# Patient Record
(demographics unavailable — no encounter records)

---

## 2024-12-05 NOTE — PHYSICAL EXAM
[Normal Jugular Venous A Waves Present] : normal jugular venous A waves present [Normal Jugular Venous V Waves Present] : normal jugular venous V waves present [No Jugular Venous Gibbons A Waves] : no jugular venous gibbons A waves [Respiration, Rhythm And Depth] : normal respiratory rhythm and effort [Exaggerated Use Of Accessory Muscles For Inspiration] : no accessory muscle use [Auscultation Breath Sounds / Voice Sounds] : lungs were clear to auscultation bilaterally [Heart Rate And Rhythm] : heart rate and rhythm were normal [Heart Sounds] : normal S1 and S2 [Abdomen Soft] : soft [Abdomen Tenderness] : non-tender [Abdomen Mass (___ Cm)] : no abdominal mass palpated [Nail Clubbing] : no clubbing of the fingernails [Cyanosis, Localized] : no localized cyanosis [Petechial Hemorrhages (___cm)] : no petechial hemorrhages [] : no ischemic changes [FreeTextEntry1] : nonverbal [de-identified] : nonverbal, history from [facility] manager, wheel chair bound, contracted

## 2024-12-05 NOTE — PHYSICAL EXAM
[Normal Jugular Venous A Waves Present] : normal jugular venous A waves present [Normal Jugular Venous V Waves Present] : normal jugular venous V waves present [No Jugular Venous Gibbons A Waves] : no jugular venous gibbons A waves [Respiration, Rhythm And Depth] : normal respiratory rhythm and effort [Exaggerated Use Of Accessory Muscles For Inspiration] : no accessory muscle use [Auscultation Breath Sounds / Voice Sounds] : lungs were clear to auscultation bilaterally [Heart Rate And Rhythm] : heart rate and rhythm were normal [Heart Sounds] : normal S1 and S2 [Abdomen Soft] : soft [Abdomen Tenderness] : non-tender [Abdomen Mass (___ Cm)] : no abdominal mass palpated [Nail Clubbing] : no clubbing of the fingernails [Cyanosis, Localized] : no localized cyanosis [Petechial Hemorrhages (___cm)] : no petechial hemorrhages [] : no ischemic changes [FreeTextEntry1] : nonverbal [de-identified] : nonverbal, history from [facility] manager, wheel chair bound, contracted

## 2024-12-05 NOTE — REVIEW OF SYSTEMS
[Negative] : Heme/Lymph [FreeTextEntry2] : nonverbal, history from [facility] manager [FreeTextEntry9] : contracted

## 2024-12-05 NOTE — REASON FOR VISIT
[Other: ____] : [unfilled] [Follow-Up - Clinic] : a clinic follow-up of [FreeTextEntry1] : Cristian has a past medical history of severe HOCM conservative mgt, seizure disorder, advanced developmental delay, low functional capacity, wheelchair bound, nonverbal, hypertension, dyslipidemia, moderate MR, abnormal EKG, sinus rhythm, PRWP old anteroseptal MI unchanged, LVEF 70% echo Feb 2020.   Patient is seen in the office today with the [facility] manager who is able to give accurate history. No report of  chest pain, shortness of breath, or syncope. Patient is wheelchair bound, nonverbal, not able to give history. No new symptoms reported. Average home BPs at guideline goal on lopressor.   Echocardiogram Nov 2022 LVEF 65 to 70%, artifact seen on left ventricular septum, S.A.M. with increased LVOT gradient consistent with prior obstruction, moderate MR  Echocardiogram February 2020, LVEF 75%, moderate concentric LVH, mild diastolic dysfunction, moderate eccentric MR, severe LVOT obstruction with a peak gradient 85 mmHg, prior gradient 152.  Ascending aorta 4.4 cm, PASP 42 mmHg  EKG August 2019, sinus rhythm LVH with ST-T wave abnormality,  unchanged EKG  Echocardiogram February 2019 LV EF >65%, mild MR, moderate AS, severe LVOT obstruction peak LVOT gradient 219 mmHg, mean LVOT gradient 152 mmHg, normal RVSP  Echocardiogram February 2019 LVEF 65%, normal diastolic function, RADHA mitral valve with mild MR, aortic valve not well-visualized, moderate AS, LVOT gradient peak 219 mmHg, mean 152 mmHg consistent with severe LVOT obstruction, normal RVSP.  Findings discussed with healthcare proxy at [facility] Shadia Smartcecilia [phone].  Conservative management, continue medical therapy, no plan for catheterization or surgery.    Average home blood pressures 110/65 , heart rate 75 at [facility] on Lopressor 100mg AM and 50mg PM.   2-D echo 8/10/18 LVEF 65%, mild diastolic dysfunction, RADHA mitral valve leaflet, mild to moderate MR, mild TR, peak LVOT gradient 93 mmHg with a mean gradient 51 mmHg consistent with severe LVOT obstruction, mild pulmonary hypertension, PASP 44 mmHg.  Technically difficult study patient sitting in a wheelchair.   2-D echo August 2017, LVEF 60% mild diastolic dysfunction, mild to moderate MR, aortic valve not clearly seen, sigmoid septum 1.4cm, moderate TR, moderate pulmonary hypertension, PASP 56 mmHg, suboptimal study.  2-D echo 1/15/16, EF 65%, mild diastolic dysfunction, ascending aorta 3.6 cm, mild LVOT gradient, mild MR, suboptimal study patient not able to cooperate  Labs December 2017, total cholesterol 245, triglyceride 348, HDL 35, , patient started on fish oil, CBC, BMP and LFT normal range  Normal Sinus Rhythm  LVH, NSST,  old septal MI no change from 1/15/16,   2DEcho November 2013 reveals normal LVEF of 65% with normal wall motion, mild diastolic dysfunction, mild aortic root enlargement, thickening of the mitral valve, systolic anterior motion of the mitral valve leaflet, mild MR, and trace TR. Difficult Doppler study possible increased in LVOT velocity.  [FreeTextEntry2] : severe HOCM conserverative management on medical therapy lopressor,  low functional capacity, wheel chair bound, nonverbal, [facility]

## 2024-12-05 NOTE — REVIEW OF SYSTEMS
[Negative] : Heme/Lymph [FreeTextEntry2] : nonverbal, history from [facility] manager [FreeTextEntry9] : contracted

## 2024-12-05 NOTE — DISCUSSION/SUMMARY
[FreeTextEntry1] : Patient has medical history detailed above and active medical issues including:  - Severe HOCM improved LVOT gradient on lopressor, home BPs at goal. Findings discussed with healthcare proxy at [facility] Shadia Rey [phone] in the past. Conservative management, continue medical therapy, no plan for catheterization or surgery.  Average home blood pressures at guideline goal on Lopressor  - Abnormal baseline EKG with PRWP old anteroseptal MI unchanged. Normal wall motion echo Feb 2020.  - Hypertension.  Average home BPs at guideline goal on Lopressor.  - Dyslipidemia Hypertriglyceridemia on low carbohydrate diet and fish oil  - History of seizure, advanced developmental delay, low functional capacity, nonverbal, wheelchair bound.  Continued risk factor modification has been discussed with regard to low-sodium/low-fat diet.   Cristian will be seen in cardiology follow up in 6 months same day echo and office visit.   Current cardiac medications remain unchanged. Repeat labs will be ordered with PMD.   Cristian will follow up with Dr Gus Zuñiga for primary care.  Total time spent 45 minutes, reviewing of test results, chart information, patient discussion, physical exam and completion of chart documentation.

## 2024-12-05 NOTE — REASON FOR VISIT
[Other: ____] : [unfilled] [Follow-Up - Clinic] : a clinic follow-up of [FreeTextEntry1] : Cristian has a past medical history of severe HOCM conservative mgt, seizure disorder, advanced developmental delay, low functional capacity, wheelchair bound, nonverbal, hypertension, dyslipidemia, moderate MR, abnormal EKG, sinus rhythm, PRWP old anteroseptal MI unchanged, LVEF 70% echo Feb 2020.   Patient is seen in the office today with the [facility] manager who is able to give accurate history. No report of  chest pain, shortness of breath, or syncope. Patient is wheelchair bound, nonverbal, not able to give history. No new symptoms reported. Average home BPs at guideline goal on lopressor.   Echocardiogram Nov 2022 LVEF 65 to 70%, artifact seen on left ventricular septum, S.A.M. with increased LVOT gradient consistent with prior obstruction, moderate MR  Echocardiogram February 2020, LVEF 75%, moderate concentric LVH, mild diastolic dysfunction, moderate eccentric MR, severe LVOT obstruction with a peak gradient 85 mmHg, prior gradient 152.  Ascending aorta 4.4 cm, PASP 42 mmHg  EKG August 2019, sinus rhythm LVH with ST-T wave abnormality,  unchanged EKG  Echocardiogram February 2019 LV EF >65%, mild MR, moderate AS, severe LVOT obstruction peak LVOT gradient 219 mmHg, mean LVOT gradient 152 mmHg, normal RVSP  Echocardiogram February 2019 LVEF 65%, normal diastolic function, RADHA mitral valve with mild MR, aortic valve not well-visualized, moderate AS, LVOT gradient peak 219 mmHg, mean 152 mmHg consistent with severe LVOT obstruction, normal RVSP.  Findings discussed with healthcare proxy at [facility] Shadia Smartcecilia [phone].  Conservative management, continue medical therapy, no plan for catheterization or surgery.    Average home blood pressures 110/65 , heart rate 75 at [facility] on Lopressor 100mg AM and 50mg PM.   2-D echo 8/10/18 LVEF 65%, mild diastolic dysfunction, RADHA mitral valve leaflet, mild to moderate MR, mild TR, peak LVOT gradient 93 mmHg with a mean gradient 51 mmHg consistent with severe LVOT obstruction, mild pulmonary hypertension, PASP 44 mmHg.  Technically difficult study patient sitting in a wheelchair.   2-D echo August 2017, LVEF 60% mild diastolic dysfunction, mild to moderate MR, aortic valve not clearly seen, sigmoid septum 1.4cm, moderate TR, moderate pulmonary hypertension, PASP 56 mmHg, suboptimal study.  2-D echo 1/15/16, EF 65%, mild diastolic dysfunction, ascending aorta 3.6 cm, mild LVOT gradient, mild MR, suboptimal study patient not able to cooperate  Labs December 2017, total cholesterol 245, triglyceride 348, HDL 35, , patient started on fish oil, CBC, BMP and LFT normal range  Normal Sinus Rhythm  LVH, NSST,  old septal MI no change from 1/15/16,   2DEcho November 2013 reveals normal LVEF of 65% with normal wall motion, mild diastolic dysfunction, mild aortic root enlargement, thickening of the mitral valve, systolic anterior motion of the mitral valve leaflet, mild MR, and trace TR. Difficult Doppler study possible increased in LVOT velocity.  [FreeTextEntry2] : severe HOCM conserverative management on medical therapy lopressor,  low functional capacity, wheel chair bound, nonverbal, [facility]

## 2024-12-05 NOTE — DISCUSSION/SUMMARY
[FreeTextEntry1] : Patient has medical history detailed above and active medical issues including:  - Severe HOCM improved LVOT gradient on lopressor, home BPs at goal. Findings discussed with healthcare proxy at [facility] Shadia Rey [phone] in the past. Conservative management, continue medical therapy, no plan for catheterization or surgery.  Average home blood pressures at guideline goal on Lopressor  - Abnormal baseline EKG with PRWP old anteroseptal MI unchanged. Normal wall motion echo Feb 2020.  - Hypertension.  Average home BPs at guideline goal on Lopressor.  - Dyslipidemia Hypertriglyceridemia on low carbohydrate diet and fish oil  - History of seizure, advanced developmental delay, low functional capacity, nonverbal, wheelchair bound.  Continued risk factor modification has been discussed with regard to low-sodium/low-fat diet.   Cristian will be seen in cardiology follow up in 6 months same day echo and office visit.   Current cardiac medications remain unchanged. Repeat labs will be ordered with PMD.   Cristian will follow up with Dr Gus Zuñiga for primary care.  Total time spent 45 minutes, reviewing of test results, chart information, patient discussion, physical exam and completion of chart documentation.

## 2025-05-20 NOTE — HISTORY OF PRESENT ILLNESS
[FreeTextEntry1] : CIARRA VARGAS is a 65 year old male with a past medical history of severe HOCM conservative management on medical therapy lopressor, low functional capacity, wheel chair bound, nonverbal, history from [facility] aide with patient. severe HOCM conservative mgt, seizure disorder, advanced developmental delay, low functional capacity, wheelchair bound, nonverbal, hypertension, dyslipidemia, moderate MR, abnormal EKG, sinus rhythm, PRWP old anteroseptal MI unchanged, LVEF 70% echo Feb 2020.    Last seen 12/5/24. Patient is seen in the office today with the [facility] manager who is able to give accurate history. No report of chest pain, shortness of breath, or syncope. Patient is wheelchair bound, nonverbal, not able to give history. No new symptoms reported.  Testing:  EKG 5/19/25: SR at 90 bpm, NY interval 168 ms, QTc 405 ms, LVH, PRWP, nonspecific ST-T wave abnormalities   Echo 5/19/25: CONCLUSIONS: 1. Left ventricular cavity is small. Left ventricular systolic function is hyperdynamic with an ejection  fraction visually estimated at 65 to 70 %. 2. Basal septal hypertrophy (measures 1.4 cm) with severe left ventricular outflow tract obstruction with a  gradient of 85 mmHg. 3. Mild to moderate left ventricular hypertrophy. 4. The left ventricular diastolic function is indeterminate. 5. Left atrium is mildly dilated. 6. Aortic root at the sinuses of Valsalva is dilated, measuring 4.10 cm (indexed 2.70 cm/m). Ascending  aorta is dilated, measuring 3.90 cm (indexed 2.57 cm/m). 7. Severe LVOTO limits assessment of aortic strenosis. AOV in limited short axis images appears to  open normally. 8. Moderate mitral regurgitation. The mitral regurgitant jet is eccentrically directed. 9. Estimated pulmonary artery systolic pressure is 26 mmHg. 10. RADHA 11. Compared to the transthoracic echocardiogram performed on 11/23/2022, there have been no  significant interval changes.  Labs 3/7/25: HCT 41.3, WBC 7.33, Hgb 13.4, plt 133, Chol 120, HDL 40, Trigs 121, LDL 59, ALT 20, AST 37, Na 144, K 5.1, Cr 0.42,Mag 2.5, TSH 2.66  Labs 3/8/24: Chol 151, HDL 38, Trigs 128, LDL 89, Mag 2.3, TSH 4.4, Na 140, K 4.7, Cr 0.49, Ca 9.7, AST 22, ALT 19, A1C 5, QBX 6.83, Hgb 13.1, HCT 39.1, plt 147.  Echocardiogram Nov 2022 CONCLUSIONS: 1. Left ventricular ejection fraction is normal with an ejection fraction of 64 % by Andrade's  biplane method of discs, and estimated at 65 to 70%. 2. Small restrictive VSD vs cross talk artifact from LVOTO noted. Hemodynamically insignificant. 3. Systolic anterior motion of the mitral valve with an increased left ventricular outflow tract  gradient indicative of severe left ventricular outflow tract obstruction. 4. Mildly dilated left atrium. 5. Moderate mitral valve regurgitation. 6. No subcostal images to evaluate for pericardial effusion. 7. TTE study from 5/17/2022, normal LV function.  EKG 5/17/22: SR at 80 bpm, NY interval 162 ms, QTc 421 ms, PRWP, possible old anteroseptal infarct, nonspecific ST-T wave abnormalities   Echo 5/17/22: Prelim. Mod MR. Mod septal hypertrophy. Mild concentric LVH. Hyperdynamic LVSF. Endocardium NWV, regional wall motion cannot be commented on. Peak LVOT gradient equals 76mmHg, consistent with severe LVOT. TDS due to patient being scanned upright in wheelchair with frequent movement. Compared with echo 2/19/20, unable to assess for possible VSD.  Labs 3/9/22: Chol 231, HDL 37, Trigs 299, , Na 137, K 4.9, Cr 0.56, Ca 9.9, AST 23, ALT 34, WBC 6.84, Hgb 14.2, HCT 45.6, plt 155,   Echocardiogram done by echo [facility] October 2021, LVEF 65-70%, no mention of HOCM  EKG August 2019, sinus rhythm LVH with ST-T wave abnormality, unchanged EKG

## 2025-05-20 NOTE — END OF VISIT
[FreeTextEntry3] : 65y M w/ severe developmental delay with low functional capacity, nonverbal and wheelchair bound with HOCM and significant LVOT gradient, managed conservatively, HLD, mod MR, who presents for follow up.  Will plan for continued conservative management with beta blocker and continued surveillance TTE. Continue Crestor 20mg and Zetia 10mg

## 2025-05-20 NOTE — PHYSICAL EXAM
[Well Developed] : well developed [Well Nourished] : well nourished [No Acute Distress] : no acute distress [Normal Conjunctiva] : normal conjunctiva [Normal Venous Pressure] : normal venous pressure [No Carotid Bruit] : no carotid bruit [Normal S1, S2] : normal S1, S2 [No Murmur] : no murmur [No Rub] : no rub [No Gallop] : no gallop [Clear Lung Fields] : clear lung fields [Good Air Entry] : good air entry [No Respiratory Distress] : no respiratory distress  [Soft] : abdomen soft [Non Tender] : non-tender [No Masses/organomegaly] : no masses/organomegaly [Normal Bowel Sounds] : normal bowel sounds [Normal Gait] : normal gait [No Edema] : no edema [No Cyanosis] : no cyanosis [No Clubbing] : no clubbing [No Varicosities] : no varicosities [No Rash] : no rash [No Skin Lesions] : no skin lesions [Moves all extremities] : moves all extremities [No Focal Deficits] : no focal deficits [Normal Speech] : normal speech [Alert and Oriented] : alert and oriented [Normal memory] : normal memory [de-identified] : murmur that radiates to carotids

## 2025-05-20 NOTE — DISCUSSION/SUMMARY
[FreeTextEntry1] : CIARRA VARGAS is a 62 year old M who presents today May 17, 2022 with the above history and the following active issues:  - Severe HOCM improved LVOT gradient on lopressor, home BPs at goal. Findings discussed with healthcare proxy at [facility] [phone] in the past. Conservative management, continue medical therapy, no plan for catheterization or surgery. Average home blood pressures at guideline goal on Lopressor 100mg AM and 50mg PM, continue current meds.   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  - Hypertension. Average home BPs at guideline goal on Lopressor.  - Dyslipidemia Hypertriglyceridemia on low carbohydrate diet and fish oil, statin, and Zetia. On ASA.  - History of seizure, advanced developmental delay, low functional capacity, nonverbal, wheelchair bound.  Continued risk factor modification has been discussed with regard to low-sodium/low-fat diet.   Ongoing f/u with PCP.  F/U in 6 months with echo and carotid same day. Discussed red flag symptoms, which would warrant sooner or emergent medical evaluation. Any questions and concerns were addressed and resolved.  Sincerely, Yesenia King [facility] Patient's history, testing, and plan was reviewed with supervising physician, Dr. Willian Freeman

## 2025-05-20 NOTE — PHYSICAL EXAM
[Well Developed] : well developed [Well Nourished] : well nourished [No Acute Distress] : no acute distress [Normal Conjunctiva] : normal conjunctiva [Normal Venous Pressure] : normal venous pressure [No Carotid Bruit] : no carotid bruit [Normal S1, S2] : normal S1, S2 [No Murmur] : no murmur [No Rub] : no rub [No Gallop] : no gallop [Clear Lung Fields] : clear lung fields [Good Air Entry] : good air entry [No Respiratory Distress] : no respiratory distress  [Soft] : abdomen soft [Non Tender] : non-tender [No Masses/organomegaly] : no masses/organomegaly [Normal Bowel Sounds] : normal bowel sounds [Normal Gait] : normal gait [No Edema] : no edema [No Cyanosis] : no cyanosis [No Clubbing] : no clubbing [No Varicosities] : no varicosities [No Rash] : no rash [No Skin Lesions] : no skin lesions [Moves all extremities] : moves all extremities [No Focal Deficits] : no focal deficits [Normal Speech] : normal speech [Alert and Oriented] : alert and oriented [Normal memory] : normal memory [de-identified] : murmur that radiates to carotids

## 2025-05-20 NOTE — PHYSICAL EXAM
[Well Developed] : well developed [Well Nourished] : well nourished [No Acute Distress] : no acute distress [Normal Conjunctiva] : normal conjunctiva [Normal Venous Pressure] : normal venous pressure [No Carotid Bruit] : no carotid bruit [Normal S1, S2] : normal S1, S2 [No Murmur] : no murmur [No Rub] : no rub [No Gallop] : no gallop [Clear Lung Fields] : clear lung fields [Good Air Entry] : good air entry [No Respiratory Distress] : no respiratory distress  [Soft] : abdomen soft [Non Tender] : non-tender [No Masses/organomegaly] : no masses/organomegaly [Normal Bowel Sounds] : normal bowel sounds [Normal Gait] : normal gait [No Edema] : no edema [No Cyanosis] : no cyanosis [No Clubbing] : no clubbing [No Varicosities] : no varicosities [No Rash] : no rash [No Skin Lesions] : no skin lesions [Moves all extremities] : moves all extremities [No Focal Deficits] : no focal deficits [Normal Speech] : normal speech [Alert and Oriented] : alert and oriented [Normal memory] : normal memory [de-identified] : murmur that radiates to carotids

## 2025-05-20 NOTE — HISTORY OF PRESENT ILLNESS
[FreeTextEntry1] : CIARRA VARGAS is a 65 year old male with a past medical history of severe HOCM conservative management on medical therapy lopressor, low functional capacity, wheel chair bound, nonverbal, history from [facility] aide with patient. severe HOCM conservative mgt, seizure disorder, advanced developmental delay, low functional capacity, wheelchair bound, nonverbal, hypertension, dyslipidemia, moderate MR, abnormal EKG, sinus rhythm, PRWP old anteroseptal MI unchanged, LVEF 70% echo Feb 2020.    Last seen 12/5/24. Patient is seen in the office today with the [facility] manager who is able to give accurate history. No report of chest pain, shortness of breath, or syncope. Patient is wheelchair bound, nonverbal, not able to give history. No new symptoms reported.  Testing:  EKG 5/19/25: SR at 90 bpm, W[place] interval 168 ms, QTc 405 ms, LVH, PRWP, nonspecific ST-T wave abnormalities   Echo 5/19/25: CONCLUSIONS: 1. Left ventricular cavity is small. Left ventricular systolic function is hyperdynamic with an ejection  fraction visually estimated at 65 to 70 %. 2. Basal septal hypertrophy (measures 1.4 cm) with severe left ventricular outflow tract obstruction with a  gradient of 85 mmHg. 3. Mild to moderate left ventricular hypertrophy. 4. The left ventricular diastolic function is indeterminate. 5. Left atrium is mildly dilated. 6. Aortic root at the sinuses of Valsalva is dilated, measuring 4.10 cm (indexed 2.70 cm/m). Ascending  aorta is dilated, measuring 3.90 cm (indexed 2.57 cm/m). 7. Severe LVOTO limits assessment of aortic strenosis. AOV in limited short axis images appears to  open normally. 8. Moderate mitral regurgitation. The mitral regurgitant jet is eccentrically directed. 9. Estimated pulmonary artery systolic pressure is 26 mmHg. 10. RADHA 11. Compared to the transthoracic echocardiogram performed on 11/23/2022, there have been no  significant interval changes.  Labs 3/7/25: HCT 41.3, WBC 7.33, Hgb 13.4, plt 133, Chol 120, HDL 40, Trigs 121, LDL 59, ALT 20, AST 37, Na 144, K 5.1, Cr 0.42,Mag 2.5, TSH 2.66  Labs 3/8/24: Chol 151, HDL 38, Trigs 128, LDL 89, Mag 2.3, TSH 4.4, Na 140, K 4.7, Cr 0.49, Ca 9.7, AST 22, ALT 19, A1C 5, QBX 6.83, Hgb 13.1, HCT 39.1, plt 147.  Echocardiogram Nov 2022 CONCLUSIONS: 1. Left ventricular ejection fraction is normal with an ejection fraction of 64 % by Andrade's  biplane method of discs, and estimated at 65 to 70%. 2. Small restrictive VSD vs cross talk artifact from LVOTO noted. Hemodynamically insignificant. 3. Systolic anterior motion of the mitral valve with an increased left ventricular outflow tract  gradient indicative of severe left ventricular outflow tract obstruction. 4. Mildly dilated left atrium. 5. Moderate mitral valve regurgitation. 6. No subcostal images to evaluate for pericardial effusion. 7. TTE study from 5/17/2022, normal LV function.  EKG 5/17/22: SR at 80 bpm, WV interval 162 ms, QTc 421 ms, PRWP, possible old anteroseptal infarct, nonspecific ST-T wave abnormalities   Echo 5/17/22: Prelim. Mod MR. Mod septal hypertrophy. Mild concentric LVH. Hyperdynamic LVSF. Endocardium NWV, regional wall motion cannot be commented on. Peak LVOT gradient equals 76mmHg, consistent with severe LVOT. TDS due to patient being scanned upright in wheelchair with frequent movement. Compared with echo 2/19/20, unable to assess for possible VSD.  Labs 3/9/22: Chol 231, HDL 37, Trigs 299, , Na 137, K 4.9, Cr 0.56, Ca 9.9, AST 23, ALT 34, WBC 6.84, Hgb 14.2, HCT 45.6, plt 155,   Echocardiogram done by echo [employer] [facility] October 2021, LVEF 65-70%, no mention of HOCM  EKG August 2019, sinus rhythm LVH with ST-T wave abnormality, unchanged EKG

## 2025-05-20 NOTE — DISCUSSION/SUMMARY
[FreeTextEntry1] : CIARRA VARGAS is a 62 year old M who presents today May 17, 2022 with the above history and the following active issues:  - Severe HOCM improved LVOT gradient on lopressor, home BPs at goal. Findings discussed with healthcare proxy at [facility] [phone] in the past. Conservative management, continue medical therapy, no plan for catheterization or surgery. Average home blood pressures at guideline goal on Lopressor 100mg AM and 50mg PM, continue current meds.   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  - Hypertension. Average home BPs at guideline goal on Lopressor.  - Dyslipidemia Hypertriglyceridemia on low carbohydrate diet and fish oil, statin, and Zetia. On ASA.  - History of seizure, advanced developmental delay, low functional capacity, nonverbal, wheelchair bound.  Continued risk factor modification has been discussed with regard to low-sodium/low-fat diet.   Ongoing f/u with PCP.  F/U in 6 months with echo and carotid same day. Discussed red flag symptoms, which would warrant sooner or emergent medical evaluation. Any questions and concerns were addressed and resolved.  Sincerely, Yesenia King [facility] Patient's history, testing, and plan was reviewed with supervising physician, Dr. Willian Freeman

## 2025-05-20 NOTE — DISCUSSION/SUMMARY
[FreeTextEntry1] : CIARRA VARGAS is a 62 year old M who presents today May 17, 2022 with the above history and the following active issues:  - Severe HOCM improved LVOT gradient on lopressor, home BPs at goal. Findings discussed with healthcare proxy at [facility] [phone] in the past. Conservative management, continue medical therapy, no plan for catheterization or surgery. Average home blood pressures at guideline goal on Lopressor 100mg AM and 50mg PM, continue current meds.   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  - Hypertension. Average home BPs at guideline goal on Lopressor.  - Dyslipidemia Hypertriglyceridemia on low carbohydrate diet and fish oil, statin, and Zetia. On ASA.  - History of seizure, advanced developmental delay, low functional capacity, nonverbal, wheelchair bound.  Continued risk factor modification has been discussed with regard to low-sodium/low-fat diet.   Ongoing f/u with PCP.  F/U in 6 months with echo and carotid same day. Discussed red flag symptoms, which would warrant sooner or emergent medical evaluation. Any questions and concerns were addressed and resolved.  Sincerely, Yesenia King [facility] Patient's history, testing, and plan was reviewed with supervising physician, Dr. Willian Freeman

## 2025-05-20 NOTE — HISTORY OF PRESENT ILLNESS
[FreeTextEntry1] : CIARRA VARGAS is a 65 year old male with a past medical history of severe HOCM conservative management on medical therapy lopressor, low functional capacity, wheel chair bound, nonverbal, history from [facility] aide with patient. severe HOCM conservative mgt, seizure disorder, advanced developmental delay, low functional capacity, wheelchair bound, nonverbal, hypertension, dyslipidemia, moderate MR, abnormal EKG, sinus rhythm, PRWP old anteroseptal MI unchanged, LVEF 70% echo Feb 2020.    Last seen 12/5/24. Patient is seen in the office today with the [facility] manager who is able to give accurate history. No report of chest pain, shortness of breath, or syncope. Patient is wheelchair bound, nonverbal, not able to give history. No new symptoms reported.  Testing:  EKG 5/19/25: SR at 90 bpm, OR interval 168 ms, QTc 405 ms, LVH, PRWP, nonspecific ST-T wave abnormalities   Echo 5/19/25: CONCLUSIONS: 1. Left ventricular cavity is small. Left ventricular systolic function is hyperdynamic with an ejection  fraction visually estimated at 65 to 70 %. 2. Basal septal hypertrophy (measures 1.4 cm) with severe left ventricular outflow tract obstruction with a  gradient of 85 mmHg. 3. Mild to moderate left ventricular hypertrophy. 4. The left ventricular diastolic function is indeterminate. 5. Left atrium is mildly dilated. 6. Aortic root at the sinuses of Valsalva is dilated, measuring 4.10 cm (indexed 2.70 cm/m). Ascending  aorta is dilated, measuring 3.90 cm (indexed 2.57 cm/m). 7. Severe LVOTO limits assessment of aortic strenosis. AOV in limited short axis images appears to  open normally. 8. Moderate mitral regurgitation. The mitral regurgitant jet is eccentrically directed. 9. Estimated pulmonary artery systolic pressure is 26 mmHg. 10. RADHA 11. Compared to the transthoracic echocardiogram performed on 11/23/2022, there have been no  significant interval changes.  Labs 3/7/25: HCT 41.3, WBC 7.33, Hgb 13.4, plt 133, Chol 120, HDL 40, Trigs 121, LDL 59, ALT 20, AST 37, Na 144, K 5.1, Cr 0.42,Mag 2.5, TSH 2.66  Labs 3/8/24: Chol 151, HDL 38, Trigs 128, LDL 89, Mag 2.3, TSH 4.4, Na 140, K 4.7, Cr 0.49, Ca 9.7, AST 22, ALT 19, A1C 5, QBX 6.83, Hgb 13.1, HCT 39.1, plt 147.  Echocardiogram Nov 2022 CONCLUSIONS: 1. Left ventricular ejection fraction is normal with an ejection fraction of 64 % by Andrade's  biplane method of discs, and estimated at 65 to 70%. 2. Small restrictive VSD vs cross talk artifact from LVOTO noted. Hemodynamically insignificant. 3. Systolic anterior motion of the mitral valve with an increased left ventricular outflow tract  gradient indicative of severe left ventricular outflow tract obstruction. 4. Mildly dilated left atrium. 5. Moderate mitral valve regurgitation. 6. No subcostal images to evaluate for pericardial effusion. 7. TTE study from 5/17/2022, normal LV function.  EKG 5/17/22: SR at 80 bpm, OR interval 162 ms, QTc 421 ms, PRWP, possible old anteroseptal infarct, nonspecific ST-T wave abnormalities   Echo 5/17/22: Prelim. Mod MR. Mod septal hypertrophy. Mild concentric LVH. Hyperdynamic LVSF. Endocardium NWV, regional wall motion cannot be commented on. Peak LVOT gradient equals 76mmHg, consistent with severe LVOT. TDS due to patient being scanned upright in wheelchair with frequent movement. Compared with echo 2/19/20, unable to assess for possible VSD.  Labs 3/9/22: Chol 231, HDL 37, Trigs 299, , Na 137, K 4.9, Cr 0.56, Ca 9.9, AST 23, ALT 34, WBC 6.84, Hgb 14.2, HCT 45.6, plt 155,   Echocardiogram done by echo [facility] October 2021, LVEF 65-70%, no mention of HOCM  EKG August 2019, sinus rhythm LVH with ST-T wave abnormality, unchanged EKG